# Patient Record
Sex: MALE | Race: WHITE | NOT HISPANIC OR LATINO | Employment: FULL TIME | URBAN - METROPOLITAN AREA
[De-identification: names, ages, dates, MRNs, and addresses within clinical notes are randomized per-mention and may not be internally consistent; named-entity substitution may affect disease eponyms.]

---

## 2023-01-04 ENCOUNTER — TRANSFERRED RECORDS (OUTPATIENT)
Dept: HEALTH INFORMATION MANAGEMENT | Facility: CLINIC | Age: 38
End: 2023-01-04

## 2023-01-10 ENCOUNTER — TRANSFERRED RECORDS (OUTPATIENT)
Dept: HEALTH INFORMATION MANAGEMENT | Facility: CLINIC | Age: 38
End: 2023-01-10

## 2023-02-17 ENCOUNTER — TRANSCRIBE ORDERS (OUTPATIENT)
Dept: OTHER | Age: 38
End: 2023-02-17

## 2023-02-17 ENCOUNTER — APPOINTMENT (OUTPATIENT)
Dept: URBAN - METROPOLITAN AREA CLINIC 259 | Age: 38
Setting detail: DERMATOLOGY
End: 2023-02-20

## 2023-02-17 DIAGNOSIS — L57.8 OTHER SKIN CHANGES DUE TO CHRONIC EXPOSURE TO NONIONIZING RADIATION: ICD-10-CM

## 2023-02-17 DIAGNOSIS — L81.4 OTHER MELANIN HYPERPIGMENTATION: ICD-10-CM

## 2023-02-17 DIAGNOSIS — D22 MELANOCYTIC NEVI: ICD-10-CM

## 2023-02-17 DIAGNOSIS — L29.8 OTHER PRURITUS: ICD-10-CM

## 2023-02-17 DIAGNOSIS — L40.0 PSORIASIS VULGARIS: ICD-10-CM

## 2023-02-17 DIAGNOSIS — C96.6 LANGERHAN'S CELL HISTIOCYTOSIS (H): Primary | ICD-10-CM

## 2023-02-17 PROBLEM — D22.62 MELANOCYTIC NEVI OF LEFT UPPER LIMB, INCLUDING SHOULDER: Status: ACTIVE | Noted: 2023-02-17

## 2023-02-17 PROBLEM — D22.61 MELANOCYTIC NEVI OF RIGHT UPPER LIMB, INCLUDING SHOULDER: Status: ACTIVE | Noted: 2023-02-17

## 2023-02-17 PROBLEM — D22.4 MELANOCYTIC NEVI OF SCALP AND NECK: Status: ACTIVE | Noted: 2023-02-17

## 2023-02-17 PROBLEM — D22.39 MELANOCYTIC NEVI OF OTHER PARTS OF FACE: Status: ACTIVE | Noted: 2023-02-17

## 2023-02-17 PROBLEM — D22.5 MELANOCYTIC NEVI OF TRUNK: Status: ACTIVE | Noted: 2023-02-17

## 2023-02-17 PROCEDURE — OTHER COUNSELING: OTHER

## 2023-02-17 PROCEDURE — OTHER PRESCRIPTION MEDICATION MANAGEMENT: OTHER

## 2023-02-17 PROCEDURE — 99203 OFFICE O/P NEW LOW 30 MIN: CPT

## 2023-02-17 PROCEDURE — OTHER MIPS QUALITY: OTHER

## 2023-02-17 PROCEDURE — OTHER PRESCRIPTION: OTHER

## 2023-02-17 RX ORDER — HYDROCORTISONE 25 MG/G
OINTMENT TOPICAL
Qty: 28.35 | Refills: 3 | Status: ERX | COMMUNITY
Start: 2023-02-17

## 2023-02-17 RX ORDER — FLUOCINONIDE 0.5 MG/G
OINTMENT TOPICAL
Qty: 60 | Refills: 3 | Status: ERX | COMMUNITY
Start: 2023-02-17

## 2023-02-17 ASSESSMENT — LOCATION SIMPLE DESCRIPTION DERM
LOCATION SIMPLE: RIGHT SUPERIOR EYELID
LOCATION SIMPLE: LEFT UPPER ARM
LOCATION SIMPLE: LEFT UPPER BACK
LOCATION SIMPLE: LEFT EAR
LOCATION SIMPLE: RIGHT EAR
LOCATION SIMPLE: RIGHT FOREARM
LOCATION SIMPLE: LEFT ANTERIOR NECK
LOCATION SIMPLE: RIGHT HAND
LOCATION SIMPLE: LEFT FOREARM
LOCATION SIMPLE: RIGHT ELBOW
LOCATION SIMPLE: CHEST
LOCATION SIMPLE: LEFT CHEEK
LOCATION SIMPLE: ABDOMEN

## 2023-02-17 ASSESSMENT — LOCATION ZONE DERM
LOCATION ZONE: EYELID
LOCATION ZONE: FACE
LOCATION ZONE: ARM
LOCATION ZONE: EAR
LOCATION ZONE: NECK
LOCATION ZONE: HAND
LOCATION ZONE: TRUNK

## 2023-02-17 ASSESSMENT — LOCATION DETAILED DESCRIPTION DERM
LOCATION DETAILED: LEFT SUPERIOR ANTERIOR NECK
LOCATION DETAILED: LEFT SUPERIOR MEDIAL UPPER BACK
LOCATION DETAILED: RIGHT DISTAL DORSAL FOREARM
LOCATION DETAILED: RIGHT ANTECUBITAL SKIN
LOCATION DETAILED: LEFT ANTERIOR DISTAL UPPER ARM
LOCATION DETAILED: RIGHT LATERAL SUPERIOR EYELID
LOCATION DETAILED: LEFT INFERIOR CENTRAL MALAR CHEEK
LOCATION DETAILED: PERIUMBILICAL SKIN
LOCATION DETAILED: LEFT INFERIOR MEDIAL UPPER BACK
LOCATION DETAILED: LEFT INFERIOR LATERAL NECK
LOCATION DETAILED: LEFT CRUS OF HELIX
LOCATION DETAILED: STERNUM
LOCATION DETAILED: RIGHT PROXIMAL DORSAL FOREARM
LOCATION DETAILED: RIGHT CRUS OF HELIX
LOCATION DETAILED: LEFT INFERIOR LATERAL MALAR CHEEK
LOCATION DETAILED: LEFT MEDIAL SUPERIOR CHEST
LOCATION DETAILED: RIGHT RADIAL DORSAL HAND
LOCATION DETAILED: LEFT PROXIMAL DORSAL FOREARM
LOCATION DETAILED: RIGHT CAVUM CONCHA
LOCATION DETAILED: LEFT CAVUM CONCHA
LOCATION DETAILED: LEFT LATERAL ABDOMEN
LOCATION DETAILED: RIGHT ULNAR DORSAL HAND

## 2023-02-17 NOTE — HPI: RASH
What Type Of Note Output Would You Prefer (Optional)?: Standard Output
How Severe Is Your Rash?: mild
Is This A New Presentation, Or A Follow-Up?: Rash
Additional History: Pt states his wife has psoriasis and he has been using her steroid cream which does help. He has joint pain in his right shoulder, hip and neck. He is allergic to mold, and has seasonal and pet allergies. His grandma and sister gave history of eczema. Pt denies bilateral joint pain that is chronic. Pt had a colonoscopy in January and had a polyp removed that had a Langerhan cell. His doctor is concerned about histiocytosis. He was advised to see a dermatologist if he was having any skin issues.

## 2023-02-17 NOTE — PROCEDURE: PRESCRIPTION MEDICATION MANAGEMENT
Detail Level: Zone
Samples Given: Zoryve
Initiate Treatment: Fluocinonide ointment BID\\nHydrocortisone 2.5% ointment to eyelids TIW PRN\\nZoryve QD PRN
Render In Strict Bullet Format?: No

## 2023-03-11 DIAGNOSIS — C96.6 LANGERHAN'S CELL HISTIOCYTOSIS (H): Primary | ICD-10-CM

## 2023-03-14 ENCOUNTER — ONCOLOGY VISIT (OUTPATIENT)
Dept: TRANSPLANT | Facility: CLINIC | Age: 38
End: 2023-03-14
Attending: INTERNAL MEDICINE
Payer: COMMERCIAL

## 2023-03-14 ENCOUNTER — APPOINTMENT (OUTPATIENT)
Dept: URBAN - METROPOLITAN AREA CLINIC 259 | Age: 38
Setting detail: DERMATOLOGY
End: 2023-03-14

## 2023-03-14 ENCOUNTER — TELEPHONE (OUTPATIENT)
Dept: TRANSPLANT | Facility: CLINIC | Age: 38
End: 2023-03-14

## 2023-03-14 ENCOUNTER — PATIENT OUTREACH (OUTPATIENT)
Dept: ONCOLOGY | Facility: CLINIC | Age: 38
End: 2023-03-14

## 2023-03-14 ENCOUNTER — APPOINTMENT (OUTPATIENT)
Dept: LAB | Facility: CLINIC | Age: 38
End: 2023-03-14
Attending: INTERNAL MEDICINE
Payer: COMMERCIAL

## 2023-03-14 VITALS
RESPIRATION RATE: 16 BRPM | HEIGHT: 72 IN | SYSTOLIC BLOOD PRESSURE: 116 MMHG | TEMPERATURE: 97.6 F | HEART RATE: 61 BPM | DIASTOLIC BLOOD PRESSURE: 76 MMHG | OXYGEN SATURATION: 98 % | WEIGHT: 215.9 LBS | BODY MASS INDEX: 29.24 KG/M2

## 2023-03-14 DIAGNOSIS — R21 RASH AND OTHER NONSPECIFIC SKIN ERUPTION: ICD-10-CM

## 2023-03-14 DIAGNOSIS — C96.6 LANGERHAN'S CELL HISTIOCYTOSIS (H): ICD-10-CM

## 2023-03-14 LAB
ALBUMIN SERPL BCG-MCNC: 4.5 G/DL (ref 3.5–5.2)
ALBUMIN UR-MCNC: NEGATIVE MG/DL
ALP SERPL-CCNC: 73 U/L (ref 40–129)
ALT SERPL W P-5'-P-CCNC: 26 U/L (ref 10–50)
ANION GAP SERPL CALCULATED.3IONS-SCNC: 11 MMOL/L (ref 7–15)
APPEARANCE UR: CLEAR
AST SERPL W P-5'-P-CCNC: 20 U/L (ref 10–50)
BASOPHILS # BLD AUTO: 0.1 10E3/UL (ref 0–0.2)
BASOPHILS NFR BLD AUTO: 1 %
BILIRUB SERPL-MCNC: 0.4 MG/DL
BILIRUB UR QL STRIP: NEGATIVE
BUN SERPL-MCNC: 19.4 MG/DL (ref 6–20)
CALCIUM SERPL-MCNC: 9.8 MG/DL (ref 8.6–10)
CHLORIDE SERPL-SCNC: 101 MMOL/L (ref 98–107)
COLOR UR AUTO: NORMAL
CORTIS SERPL-MCNC: 12 UG/DL
CREAT SERPL-MCNC: 1.01 MG/DL (ref 0.67–1.17)
CRP SERPL-MCNC: <3 MG/L
DEPRECATED HCO3 PLAS-SCNC: 24 MMOL/L (ref 22–29)
EOSINOPHIL # BLD AUTO: 0.3 10E3/UL (ref 0–0.7)
EOSINOPHIL NFR BLD AUTO: 5 %
ERYTHROCYTE [DISTWIDTH] IN BLOOD BY AUTOMATED COUNT: 12.2 % (ref 10–15)
ERYTHROCYTE [SEDIMENTATION RATE] IN BLOOD BY WESTERGREN METHOD: 8 MM/HR (ref 0–15)
FERRITIN SERPL-MCNC: 195 NG/ML (ref 31–409)
GFR SERPL CREATININE-BSD FRML MDRD: >90 ML/MIN/1.73M2
GH SERPL-MCNC: 0.1 UG/L
GLUCOSE SERPL-MCNC: 95 MG/DL (ref 70–99)
GLUCOSE UR STRIP-MCNC: NEGATIVE MG/DL
HCT VFR BLD AUTO: 43.3 % (ref 40–53)
HGB BLD-MCNC: 15.7 G/DL (ref 13.3–17.7)
HGB UR QL STRIP: NEGATIVE
IMM GRANULOCYTES # BLD: 0 10E3/UL
IMM GRANULOCYTES NFR BLD: 0 %
IRON BINDING CAPACITY (ROCHE): 289 UG/DL (ref 240–430)
IRON SATN MFR SERPL: 33 % (ref 15–46)
IRON SERPL-MCNC: 94 UG/DL (ref 61–157)
KETONES UR STRIP-MCNC: NEGATIVE MG/DL
LDH SERPL L TO P-CCNC: 156 U/L (ref 0–250)
LEUKOCYTE ESTERASE UR QL STRIP: NEGATIVE
LYMPHOCYTES # BLD AUTO: 3 10E3/UL (ref 0.8–5.3)
LYMPHOCYTES NFR BLD AUTO: 39 %
MCH RBC QN AUTO: 29.7 PG (ref 26.5–33)
MCHC RBC AUTO-ENTMCNC: 36.3 G/DL (ref 31.5–36.5)
MCV RBC AUTO: 82 FL (ref 78–100)
MONOCYTES # BLD AUTO: 0.8 10E3/UL (ref 0–1.3)
MONOCYTES NFR BLD AUTO: 10 %
NEUTROPHILS # BLD AUTO: 3.4 10E3/UL (ref 1.6–8.3)
NEUTROPHILS NFR BLD AUTO: 45 %
NITRATE UR QL: NEGATIVE
NRBC # BLD AUTO: 0 10E3/UL
NRBC BLD AUTO-RTO: 0 /100
PH UR STRIP: 5 [PH] (ref 5–7)
PLATELET # BLD AUTO: 259 10E3/UL (ref 150–450)
POTASSIUM SERPL-SCNC: 4.2 MMOL/L (ref 3.4–5.3)
PROT SERPL-MCNC: 6.8 G/DL (ref 6.4–8.3)
RBC # BLD AUTO: 5.29 10E6/UL (ref 4.4–5.9)
RBC URINE: 0 /HPF
SODIUM SERPL-SCNC: 136 MMOL/L (ref 136–145)
SP GR UR STRIP: 1.02 (ref 1–1.03)
TOTAL PROTEIN SERUM FOR ELP: 6.6 G/DL (ref 6.4–8.3)
TSH SERPL DL<=0.005 MIU/L-ACNC: 3.56 UIU/ML (ref 0.3–4.2)
UROBILINOGEN UR STRIP-MCNC: NORMAL MG/DL
WBC # BLD AUTO: 7.5 10E3/UL (ref 4–11)
WBC URINE: <1 /HPF

## 2023-03-14 PROCEDURE — OTHER COUNSELING: OTHER

## 2023-03-14 PROCEDURE — 81001 URINALYSIS AUTO W/SCOPE: CPT | Performed by: INTERNAL MEDICINE

## 2023-03-14 PROCEDURE — 86140 C-REACTIVE PROTEIN: CPT | Performed by: INTERNAL MEDICINE

## 2023-03-14 PROCEDURE — OTHER MIPS QUALITY: OTHER

## 2023-03-14 PROCEDURE — 99205 OFFICE O/P NEW HI 60 MIN: CPT | Performed by: INTERNAL MEDICINE

## 2023-03-14 PROCEDURE — 83615 LACTATE (LD) (LDH) ENZYME: CPT | Performed by: INTERNAL MEDICINE

## 2023-03-14 PROCEDURE — 84155 ASSAY OF PROTEIN SERUM: CPT | Performed by: INTERNAL MEDICINE

## 2023-03-14 PROCEDURE — 84165 PROTEIN E-PHORESIS SERUM: CPT | Mod: TC | Performed by: PATHOLOGY

## 2023-03-14 PROCEDURE — 80053 COMPREHEN METABOLIC PANEL: CPT | Performed by: INTERNAL MEDICINE

## 2023-03-14 PROCEDURE — OTHER BIOPSY BY PUNCH METHOD: OTHER

## 2023-03-14 PROCEDURE — 36415 COLL VENOUS BLD VENIPUNCTURE: CPT | Performed by: INTERNAL MEDICINE

## 2023-03-14 PROCEDURE — OTHER BIOPSY BY SHAVE METHOD: OTHER

## 2023-03-14 PROCEDURE — 85652 RBC SED RATE AUTOMATED: CPT | Performed by: INTERNAL MEDICINE

## 2023-03-14 PROCEDURE — 84443 ASSAY THYROID STIM HORMONE: CPT | Performed by: INTERNAL MEDICINE

## 2023-03-14 PROCEDURE — 83003 ASSAY GROWTH HORMONE (HGH): CPT | Performed by: INTERNAL MEDICINE

## 2023-03-14 PROCEDURE — 85025 COMPLETE CBC W/AUTO DIFF WBC: CPT | Performed by: INTERNAL MEDICINE

## 2023-03-14 PROCEDURE — 84165 PROTEIN E-PHORESIS SERUM: CPT | Mod: 26

## 2023-03-14 PROCEDURE — 11103 TANGNTL BX SKIN EA SEP/ADDL: CPT

## 2023-03-14 PROCEDURE — 11104 PUNCH BX SKIN SINGLE LESION: CPT

## 2023-03-14 PROCEDURE — 82728 ASSAY OF FERRITIN: CPT | Performed by: INTERNAL MEDICINE

## 2023-03-14 PROCEDURE — G0463 HOSPITAL OUTPT CLINIC VISIT: HCPCS | Performed by: INTERNAL MEDICINE

## 2023-03-14 PROCEDURE — 82533 TOTAL CORTISOL: CPT | Performed by: INTERNAL MEDICINE

## 2023-03-14 PROCEDURE — 83550 IRON BINDING TEST: CPT | Performed by: INTERNAL MEDICINE

## 2023-03-14 RX ORDER — FLUOCINONIDE 0.5 MG/G
OINTMENT TOPICAL PRN
COMMUNITY
Start: 2023-02-17

## 2023-03-14 RX ORDER — HYDROCORTISONE 25 MG/G
OINTMENT TOPICAL PRN
COMMUNITY
Start: 2023-02-17

## 2023-03-14 RX ORDER — HYDROXYZINE HYDROCHLORIDE 25 MG/1
1 TABLET, FILM COATED ORAL DAILY
COMMUNITY
Start: 2022-01-13

## 2023-03-14 RX ORDER — DESMOPRESSIN ACETATE 0.1 MG/1
TABLET ORAL 2 TIMES DAILY
COMMUNITY
Start: 2020-05-13

## 2023-03-14 ASSESSMENT — LOCATION DETAILED DESCRIPTION DERM
LOCATION DETAILED: RIGHT DORSAL MIDDLE METACARPOPHALANGEAL JOINT
LOCATION DETAILED: RIGHT PROXIMAL DORSAL MIDDLE FINGER

## 2023-03-14 ASSESSMENT — LOCATION SIMPLE DESCRIPTION DERM
LOCATION SIMPLE: RIGHT MIDDLE FINGER
LOCATION SIMPLE: RIGHT HAND

## 2023-03-14 ASSESSMENT — PAIN SCALES - GENERAL: PAINLEVEL: NO PAIN (0)

## 2023-03-14 ASSESSMENT — LOCATION ZONE DERM
LOCATION ZONE: FINGER
LOCATION ZONE: HAND

## 2023-03-14 NOTE — PROGRESS NOTES
Hematology/Oncology Consult Note    Marcio Carrillo MRN# [id number]   Age: 37 year old YOB: 1985          Reason for Consult:   Langerhans Cell Histiocytosis         Assessment and Plan:   Marcio Carrillo is a 37 year old   SSESSMENT:  1.  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  2.  Status post appendectomy.  3.  Diabetes insipidus.  4.  Skin rash.    PLAN:  I spoke with Clarisa and Munir about Langerhans cell histiocytosis, explaining its cell of origin and how LCH is more commonly seen in children, especially with bony disease.  Diabetes insipidus is  common in children with LCH. Involvement of the CNS requires systemic therapy because it is associated with adverse prognosis and increased risk of neurologic damage.  LCH in  the liver, spleen, and bone marrow confers a higher risk of treatment failure and death    I explained to him that patients with multisystem disease usually require chemotherapy, while unicentric disease does not. Since he has hypophysis and GI involvement he technically has low risk multisystem LCH disease.In patients with low risk disease in general, chemotherapy would be most useful. Low dose Daniella C or cladribine have been shown to be effective.In studies published in PLOS One ( PLoS One. 2012;7(8):l65425. Epub 2012 Aug 15)  and in Blood in 2015 (Sep 17;126(12):1415-23. doi: 10.1182/[id number]), cytosine arabinoside 5 days every month for 12 months has been effective In his case, it would be ideal if we could repeat the MRI of the pituitary and brain, as well as obtain the pathologic specimen of the colon to be able to do BRAF staining.  I would also like him to get pulmonary functions and talked to him about quitting smoking. I talked to him about the side effects of chemotherapy, the issues of fertility and sterility and need for potential sperm banking.  He was  interested in sperm banking, nor was his wife, Clarisa.    The main question is  does he need treatment now?.  I do not feel that one could consider using a BRAF inhibitor, MEK or MAP kinase inhibitor alone at this time.  I explained to them that the cytosine arabinoside would not probably improve his diabetes insipidus and that he needs to continue taking the desmopressin.  I suggested that he get another opinion at the [facility] with Dr. Nettles.  I spoke with Dr. Carey, who will arrange an MRI of the pituitary, pulmonary functions and Chang referral.  I also would ask Dr. Carlos to review his MRI of the pituitary and see if the lesion has expanded or if there are any other neurodegenerative changes on his MRI consistent with LCH. He should see Dermatology and see if the skin lesions are LCH. If biopsy is done molecular studies for BRAF, MEK and MAPK mutations should be looked for in the skin We gave him some patient information about LCH.  We urged him to quit smoking, although he does not seem to have any changes on his CT in his lungs.  I will set up an appointment in a couple of months.  We talked about whether or not watchful waiting would be reasonable with frequent colonoscopies, and I would like to have a consensus of opinion on his treatment with all of the physicians involved and will await the opinion at [facility].    I spent a total of 60 minutes face to face with Marcio Carrillo during today's office visit. Over 50% of this time was spent counseling the patient and coordinating the care regarding Langerhans cell histiocytosis  and 30 minutes preparing to see the patient and  care coordination     Nikita Avery MD  [phone]  ADDENDUM  BRAF STAIN POSITIVE ON COLON BIOPSY CONFIRMING LCH         History of Present Illness:   History obtained from chart review and confirmed with patient.  HISTORY OF PRESENT ILLNESS:  I had the pleasure of seeing Munir Carrillo for evaluation of diabetes insipidus and evidence of a colonic nodule consistent with Langerhans cell histiocytosis.  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  He is here today along with his wife, Clarisa, for evaluation of Langerhans cell histiocytosis. Munir had noted back in 2017 and 2018, especially when he was in [place] that he was drinking a lot of water and urinating frequently.  He states that he would get up several times a night and, in fact, would drink three 20-ounce cups of water in the morning and then several times drink during the night and five to six 20 ounce bottles later in the day.  He was found to have a low urine specific gravity and urine osmolality.  His specific gravity was 1.010. His urine osmolality was 190, his blood osmolality was 299 and his serum sodium 143.  He underwent an MRI and was found to have a mass in his pituitary.  He had no visual symptoms and a diagnosis of diabetes insipidus was made.  There were no other lesions, no other lung lesions.  He was started on intranasal desmopressin and then subsequently on desmopressin 0.1 mg per day and has had no increased thirst symptoms recently..  He is followed by Dr. Carlos, an endocrinologist.  He notes that he has had a skin rash for years, mainly in his ears, his eyelids and knuckle.  He had been seen by Dermatology.  He was given Lidex and hydrocortisone that caused improvement.  No biopsy was done.  In January, he had a colonoscopy because his mother was diagnosed with colon cancer in 10/2022.  A polyp was found and a nodule.  The nodule proved to be consistent with Langerhans cell histiocytosis.  The one polyp was a tubular adenoma.  The other polyp was a Langerhans cell nodule, well circumscribed, 1 mm, predominantly submucosal.  The nodule was positive for S100.  CD68 was weakly positive and CD1A was positive.  BRAF staining was not done.    He was then seen by Oncology, Dr. Didier Carey, who suggested that he come to the [facility] for further evaluation.  He has no visual symptoms and feels good.  He  otherwise denies any fever, chills, nausea or vomiting.  He denies any respiratory symptoms, although he continues to smoke.  A PET scan was done, which showed increased uptake in C4-C7, as well as the right ilium, but the CT showed no lesions of lytic nature or blastic lesions.         Review of Systems:   A comprehensive ROS was performed with the patient and was found to be negative with the exception of that noted in the HPI above  EYES No visual sx  EARS No hearing issues occ distraction hearing issues  SINUS No sinus trouble but seasonal allergies  CHEST No cough no SOB no chest pain no asthma  COR No chest pain no palpitations  GI Occ reflux No N,V or Diarrhea.No liver issures  MS some joint pain in hands No other bone pain  SKIN Psoriasis as in HPI  ENDO Diabetes Insipidu  No thyroid issues no diabetes mellitus  Blood glucose ok  NEURO DI as in hpi no headache or seizures or weaknes  PSYCH mood OK  ID Had COVID in   HEME No anemia or bleeding or clotting issue       Past Medical History:     Past Medical History:   Diagnosis Date     NO ACTIVE PROBLEMS    Seasonal allergie  Diabetes Insipidus         Past Surgical History:     Past Surgical History:   Procedure Laterality Date     [facility] NONSPECIFIC PROCEDURE      lipoma removal right buttock     Appendectomy ruptured appendix 2022       Social History:   Still smoking a few cigarettes  Social History     Socioeconomic History     Marital status:      Spouse name: Not on file     Number of children: Not on file     Years of education: Not on file     Highest education level: Not on file   Occupational History     Not on file   Tobacco Use     Smoking status: Former     Packs/day: 0.50     Years: 8.00     Pack years: 4.00     Types: Cigarettes     Quit date: 2008     Years since quittin.4     Smokeless tobacco: Never     Tobacco comments:     month   Substance and Sexual Activity     Alcohol use: Yes     Comment: Minimal use     Drug  use: No     Sexual activity: Yes     Partners: Female     Birth control/protection: Pill   Other Topics Concern      Service No     Blood Transfusions No     Caffeine Concern No     Occupational Exposure No     Hobby Hazards No     Sleep Concern No     Stress Concern No     Weight Concern Yes     Special Diet Yes     Back Care No     Exercise Yes     Bike Helmet Yes     Seat Belt Yes     Self-Exams No     Parent/sibling w/ CABG, MI or angioplasty before 65F 55M? Not Asked   Social History Narrative     Not on file     Social Determinants of Health     Financial Resource Strain: Not on file   Food Insecurity: Not on file   Transportation Needs: Not on file   Physical Activity: Not on file   Stress: Not on file   Social Connections: Not on file   Intimate Partner Violence: Not on file   Housing Stability: Not on file            Family History:     Family History   Problem Relation Age of Onset     Hypertension Father         COPD     Heart Disease Father         heart attack     Diabetes Maternal Grandfather      Hypertension Paternal Grandfather             Allergies:     Allergies   Allergen Reactions     Cats Other (See Comments)     Dogs      Horse Allergy             Medications:   (Not in a hospital admission)           Physical Exam:   /76   Pulse 61   Temp 97.6  F (36.4  C) (Oral)   Resp 16   Wt 97.9 kg (215 lb 14.4 oz)   SpO2 98%   BMI 28.88 kg/m    Vitals:    03/14/23 0732   Weight: 97.9 kg (215 lb 14.4 oz)     General: Appears well, sitting in bed, in no acute distress.  Heme/Lymph: No overt bleeding. No cervical, axillary, or supraclavicular adenopathy.  Skin: Scaly rash over 3rd right metatarsal join, pink to purple scaly rash insider left ear and right ears worrse on left.  HEENT: NCAT. EOMI, anicteric sclera. Oral mucosa pink and moist with no lesions or thrush.  Respiratory: Non-labored breathing, good air exchange, lungs clear to auscultation bilaterally.No wheezes  Cardiovascular:  Regular rate and rhythm. No murmur or rub.   Gastrointestinal: Normoactive bowel sounds. Abdomen soft, non-distended, and non-tender. No palpable masses or organomegaly.  Extremities: No extremity edema.   Neurologic: A&O x 3, speech normal, sensation to light touch grossly WNL.         Data:   I have personally reviewed the following labs/imaging:  CBC@LABRCNTIPR(wbc:4,rbc:4,hgb:4,hct:4,mcv:4,mch:4,mchc:4,rdw:4,plt:4)@  CMP@LABRCNTIPR(na:4,potassium:4,chloride:4,co2:4,aniongap:4,g,bun:4,cr:4,gfrestimated:4,gfrestblack:4,austin:4,ma,phos:4,prottotal:4,albumin:4,bilitotal:4,alkphos:4,ast:4,alt:4)@  INR@LABRCNTIPR(inr:4)@   Latest Reference Range & Units 23 07:39   Sodium 136 - 145 mmol/L 136   Potassium 3.4 - 5.3 mmol/L 4.2   Chloride 98 - 107 mmol/L 101   Carbon Dioxide (CO2) 22 - 29 mmol/L 24   Urea Nitrogen 6.0 - 20.0 mg/dL 19.4   Creatinine 0.67 - 1.17 mg/dL 1.01   GFR Estimate >60 mL/min/1.73m2 >90   Calcium 8.6 - 10.0 mg/dL 9.8   Anion Gap 7 - 15 mmol/L 11   Albumin 3.5 - 5.2 g/dL 4.5   Protein Total 6.4 - 8.3 g/dL 6.8   Alkaline Phosphatase 40 - 129 U/L 73   ALT 10 - 50 U/L 26   AST 10 - 50 U/L 20   Bilirubin Total <=1.2 mg/dL 0.4   Cortisol Serum ug/dL 12.0   CRP Inflammation <5.00 mg/L <3.00   Ferritin 31 - 409 ng/mL 195   Glucose 70 - 99 mg/dL 95   Growth Hormone <1.3 ug/L 0.1   Iron 61 - 157 ug/dL 94   Iron Binding Capacity 240 - 430 ug/dL 289   Iron Sat Index 15 - 46 % 33   Lactate Dehydrogenase 0 - 250 U/L 156   TSH 0.30 - 4.20 uIU/mL 3.56   WBC 4.0 - 11.0 10e3/uL 7.5   Hemoglobin 13.3 - 17.7 g/dL 15.7   Hematocrit 40.0 - 53.0 % 43.3   Platelet Count 150 - 450 10e3/uL 259   RBC Count 4.40 - 5.90 10e6/uL 5.29   MCV 78 - 100 fL 82   MCH 26.5 - 33.0 pg 29.7   MCHC 31.5 - 36.5 g/dL 36.3   RDW 10.0 - 15.0 % 12.2   % Neutrophils % 45   % Lymphocytes % 39   % Monocytes % 10   % Eosinophils % 5   % Basophils % 1   Absolute Basophils 0.0 - 0.2 10e3/uL 0.1   Absolute Eosinophils 0.0 - 0.7 10e3/uL 0.3    Absolute Immature Granulocytes <=0.4 10e3/uL 0.0   Absolute Lymphocytes 0.8 - 5.3 10e3/uL 3.0   Absolute Monocytes 0.0 - 1.3 10e3/uL 0.8   % Immature Granulocytes % 0   Absolute Neutrophils 1.6 - 8.3 10e3/uL 3.4   Absolute NRBCs 10e3/uL 0.0   NRBCs per 100 WBC <1 /100 0   Sed Rate 0 - 15 mm/hr 8   Color Urine Colorless, Straw, Light Yellow, Yellow  Light Yellow   Appearance Urine Clear  Clear   Glucose Urine Negative mg/dL Negative   Bilirubin Urine Negative  Negative   Ketones Urine Negative mg/dL Negative   Specific Gravity Urine 1.003 - 1.035  1.016   pH Urine 5.0 - 7.0  5.0   Protein Albumin Urine Negative mg/dL Negative   Urobilinogen mg/dL Normal, 2.0 mg/dL Normal   Nitrite Urine Negative  Negative   Blood Urine Negative  Negative   Leukocyte Esterase Urine Negative  Negative   WBC Urine <=5 /HPF <1   RBC Urine <=2 /HPF 0   Total Protein Serum for ELP 6.4 - 8.3 g/dL 6.6

## 2023-03-14 NOTE — TELEPHONE ENCOUNTER
Faxed request to [facility] for pathology slides from case [id number] per in-basket request of MAURI Carter.  [employer] tracking number: [id number]

## 2023-03-14 NOTE — PROCEDURE: BIOPSY BY PUNCH METHOD
Information: Selecting Yes will display possible errors in your note based on the variables you have selected. This validation is only offered as a suggestion for you. PLEASE NOTE THAT THE VALIDATION TEXT WILL BE REMOVED WHEN YOU FINALIZE YOUR NOTE. IF YOU WANT TO FAX A PRELIMINARY NOTE YOU WILL NEED TO TOGGLE THIS TO 'NO' IF YOU DO NOT WANT IT IN YOUR FAXED NOTE.
Validate Note Data (See Information Below): Yes
Post-Care Instructions: I reviewed with the patient in detail post-care instructions. Patient is to keep the biopsy site dry overnight, and then apply bacitracin twice daily until healed. Patient may apply hydrogen peroxide soaks to remove any crusting.
Epidermal Sutures: gel foam
Depth Of Punch Biopsy: dermis
Hemostasis: Drysol
Additional Anesthesia Volume In Cc (Will Not Render If 0): 0
Bill [id number] For Specimen Handling/Conveyance To Laboratory?: no
Billing Type: Third-Party Bill
Punch Size In Mm: 3
Wound Care: Petrolatum
Notification Instructions: Patient will be notified of biopsy results. However, patient instructed to call the office if not contacted within 2 weeks.
Biopsy Type: H and E
Anesthesia Volume In Cc (Will Not Render If 0): 0.5
Detail Level: Detailed
Home Suture Removal Text: Patient was provided a home suture removal kit and will remove their sutures at home.  If they have any questions or difficulties they will call the office.
Anesthesia Type: 1% lidocaine with epinephrine
Consent: Written consent was obtained and risks were reviewed including but not limited to scarring, infection, bleeding, scabbing, incomplete removal, nerve damage and allergy to anesthesia.
Dressing: bandage

## 2023-03-14 NOTE — LETTER
3/14/2023         RE: Marcio Carrillo  [address] [facility] [address] [facility] [address] [address]        Dear Colleague,    Thank you for referring your patient, Marcio Carrillo, to the [facility]LOOD AND MARROW [facility]ANSPLANT PRO[facility] [place]. Please see a copy of my visit note below.       Hematology/Oncology Consult Note    Marcio Carrillo MRN# [id number]   Age: 37 year old YOB: 1985          Reason for Consult:   Langerhans Cell Histiocytosis         Assessment and Plan:   Marcio Carrillo is a 37 year old   SSESSMENT:  1.  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  2.  Status post appendectomy.  3.  Diabetes insipidus.  4.  Skin rash.    PLAN:  I spoke with Mercedes about Langerhans cell histiocytosis, explaining its cell of origin and how LCH is more commonly seen in children, especially with bony disease.  Diabetes insipidus is  common in children with LCH. Involvement of the CNS requires systemic therapy because it is associated with adverse prognosis and increased risk of neurologic damage.  LCH in  the liver, spleen, and bone marrow confers a higher risk of treatment failure and death    I explained to him that patients with multisystem disease usually require chemotherapy, while unicentric disease does not. Since he has hypophysis and GI involvement he technically has low risk multisystem LCH disease.In patients with low risk disease in general, chemotherapy would be most useful. Low dose Daniella C or cladribine have been shown to be effective.In studies published in PLOS One ( PLoS One. 2012;7(8):y96797. Epub 2012 Aug 15)  and in Blood in 2015 (Sep 17;126(12):1415-23. doi: 10.1182/krsod-[id number]), cytosine arabinoside 5 days every month for 12 months has been effective In his case, it would be ideal if we could repeat the MRI of the pituitary and brain, as well as obtain the pathologic specimen of the colon to be able to do BRAF staining.  I would also like him  to get pulmonary functions and talked to him about quitting smoking. I talked to him about the side effects of chemotherapy, the issues of fertility and sterility and need for potential sperm banking.  He was  interested in sperm banking, nor was his wife, Clarisa.    The main question is does he need treatment now?.  I do not feel that one could consider using a BRAF inhibitor, MEK or MAP kinase inhibitor alone at this time.  I explained to them that the cytosine arabinoside would not probably improve his diabetes insipidus and that he needs to continue taking the desmopressin.  I suggested that he get another opinion at the [facility] with Dr. Nettles.  I spoke with Dr. Carey, who will arrange an MRI of the pituitary, pulmonary functions and Chang referral.  I also would ask Dr. Carlos to review his MRI of the pituitary and see if the lesion has expanded or if there are any other neurodegenerative changes on his MRI consistent with LCH. He should see Dermatology and see if the skin lesions are LCH. If biopsy is done molecular studies for BRAF, MEK and MAPK mutations should be looked for in the skin We gave him some patient information about LCH.  We urged him to quit smoking, although he does not seem to have any changes on his CT in his lungs.  I will set up an appointment in a couple of months.  We talked about whether or not watchful waiting would be reasonable with frequent colonoscopies, and I would like to have a consensus of opinion on his treatment with all of the physicians involved and will await the opinion at [facility].    I spent a total of 60 minutes face to face with Marcio Carrillo during today's office visit. Over 50% of this time was spent counseling the patient and coordinating the care regarding Langerhans cell histiocytosis  and 30 minutes preparing to see the patient and  care coordination     Nikita Avery MD  [phone]         History of Present Illness:   History obtained from chart  review and confirmed with patient.  HISTORY OF PRESENT ILLNESS:  I had the pleasure of seeing Munir Carrillo for evaluation of diabetes insipidus and evidence of a colonic nodule consistent with Langerhans cell histiocytosis.  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  He is here today along with his wife, Clarisa, for evaluation of Langerhans cell histiocytosis. Munir had noted back in 2017 and 2018, especially when he was in [place] that he was drinking a lot of water and urinating frequently.  He states that he would get up several times a night and, in fact, would drink three 20-ounce cups of water in the morning and then several times drink during the night and five to six 20 ounce bottles later in the day.  He was found to have a low urine specific gravity and urine osmolality.  His specific gravity was 1.010. His urine osmolality was 190, his blood osmolality was 299 and his serum sodium 143.  He underwent an MRI and was found to have a mass in his pituitary.  He had no visual symptoms and a diagnosis of diabetes insipidus was made.  There were no other lesions, no other lung lesions.  He was started on intranasal desmopressin and then subsequently on desmopressin 0.1 mg per day and has had no increased thirst symptoms recently..  He is followed by Dr. Carlos, an endocrinologist.  He notes that he has had a skin rash for years, mainly in his ears, his eyelids and knuckle.  He had been seen by Dermatology.  He was given Lidex and hydrocortisone that caused improvement.  No biopsy was done.  In January, he had a colonoscopy because his mother was diagnosed with colon cancer in 10/2022.  A polyp was found and a nodule.  The nodule proved to be consistent with Langerhans cell histiocytosis.  The one polyp was a tubular adenoma.  The other polyp was a Langerhans cell nodule, well circumscribed, 1 mm, predominantly submucosal.  The nodule was positive for S100.  CD68 was weakly  positive and CD1A was positive.  BRAF staining was not done.    He was then seen by Oncology, Dr. Didier Carey, who suggested that he come to the [facility] for further evaluation.  He has no visual symptoms and feels good.  He otherwise denies any fever, chills, nausea or vomiting.  He denies any respiratory symptoms, although he continues to smoke.  A PET scan was done, which showed increased uptake in C4-C7, as well as the right ilium, but the CT showed no lesions of lytic nature or blastic lesions.         Review of Systems:   A comprehensive ROS was performed with the patient and was found to be negative with the exception of that noted in the HPI above  EYES No visual sx  EARS No hearing issues occ distraction hearing issues  SINUS No sinus trouble but seasonal allergies  CHEST No cough no SOB no chest pain no asthma  COR No chest pain no palpitations  GI Occ reflux No N,V or Diarrhea.No liver issures  MS some joint pain in hands No other bone pain  SKIN Psoriasis as in HPI  ENDO Diabetes Insipidu  No thyroid issues no diabetes mellitus  Blood glucose ok  NEURO DI as in hpi no headache or seizures or weaknes  PSYCH mood OK  ID Had COVID in Feb 21  HEME No anemia or bleeding or clotting issue       Past Medical History:     Past Medical History:   Diagnosis Date     NO ACTIVE PROBLEMS    Seasonal allergie  Diabetes Insipidus         Past Surgical History:     Past Surgical History:   Procedure Laterality Date     Z NONSPECIFIC PROCEDURE      lipoma removal right buttock     Appendectomy ruptured appendix June 2022       Social History:   Still smoking a few cigarettes  Social History     Socioeconomic History     Marital status:      Spouse name: Not on file     Number of children: Not on file     Years of education: Not on file     Highest education level: Not on file   Occupational History     Not on file   Tobacco Use     Smoking status: Former     Packs/day: 0.50     Years: 8.00     Pack  years: 4.00     Types: Cigarettes     Quit date: 2008     Years since quittin.4     Smokeless tobacco: Never     Tobacco comments:     month   Substance and Sexual Activity     Alcohol use: Yes     Comment: Minimal use     Drug use: No     Sexual activity: Yes     Partners: Female     Birth control/protection: Pill   Other Topics Concern      Service No     Blood Transfusions No     Caffeine Concern No     Occupational Exposure No     Hobby Hazards No     Sleep Concern No     Stress Concern No     Weight Concern Yes     Special Diet Yes     Back Care No     Exercise Yes     Bike Helmet Yes     Seat Belt Yes     Self-Exams No     Parent/sibling w/ CABG, MI or angioplasty before 65F 55M? Not Asked   Social History Narrative     Not on file     Social Determinants of Health     Financial Resource Strain: Not on file   Food Insecurity: Not on file   Transportation Needs: Not on file   Physical Activity: Not on file   Stress: Not on file   Social Connections: Not on file   Intimate Partner Violence: Not on file   Housing Stability: Not on file            Family History:     Family History   Problem Relation Age of Onset     Hypertension Father         COPD     Heart Disease Father         heart attack     Diabetes Maternal Grandfather      Hypertension Paternal Grandfather             Allergies:     Allergies   Allergen Reactions     Cats Other (See Comments)     Dogs      Horse Allergy             Medications:   (Not in a hospital admission)           Physical Exam:   /76   Pulse 61   Temp 97.6  F (36.4  C) (Oral)   Resp 16   Wt 97.9 kg (215 lb 14.4 oz)   SpO2 98%   BMI 28.88 kg/m    Vitals:    23 0732   Weight: 97.9 kg (215 lb 14.4 oz)     General: Appears well, sitting in bed, in no acute distress.  Heme/Lymph: No overt bleeding. No cervical, axillary, or supraclavicular adenopathy.  Skin: Scaly rash over 3rd right metatarsal join, pink to purple scaly rash insider left ear and  right ears worrse on left.  HEENT: NCAT. EOMI, anicteric sclera. Oral mucosa pink and moist with no lesions or thrush.  Respiratory: Non-labored breathing, good air exchange, lungs clear to auscultation bilaterally.No wheezes  Cardiovascular: Regular rate and rhythm. No murmur or rub.   Gastrointestinal: Normoactive bowel sounds. Abdomen soft, non-distended, and non-tender. No palpable masses or organomegaly.  Extremities: No extremity edema.   Neurologic: A&O x 3, speech normal, sensation to light touch grossly WNL.         Data:   I have personally reviewed the following labs/imaging:  CBC@LABRCNTIPR(wbc:4,rbc:4,hgb:4,hct:4,mcv:4,mch:4,mchc:4,rdw:4,plt:4)@  CMP@LABRCNTIPR(na:4,potassium:4,chloride:4,co2:4,aniongap:4,g,bun:4,cr:4,gfrestimated:4,gfrestblack:4,austin:4,ma,phos:4,prottotal:4,albumin:4,bilitotal:4,alkphos:4,ast:4,alt:4)@  INR@LABRCNTIPR(inr:4)@   Latest Reference Range & Units 23 07:39   Sodium 136 - 145 mmol/L 136   Potassium 3.4 - 5.3 mmol/L 4.2   Chloride 98 - 107 mmol/L 101   Carbon Dioxide (CO2) 22 - 29 mmol/L 24   Urea Nitrogen 6.0 - 20.0 mg/dL 19.4   Creatinine 0.67 - 1.17 mg/dL 1.01   GFR Estimate >60 mL/min/1.73m2 >90   Calcium 8.6 - 10.0 mg/dL 9.8   Anion Gap 7 - 15 mmol/L 11   Albumin 3.5 - 5.2 g/dL 4.5   Protein Total 6.4 - 8.3 g/dL 6.8   Alkaline Phosphatase 40 - 129 U/L 73   ALT 10 - 50 U/L 26   AST 10 - 50 U/L 20   Bilirubin Total <=1.2 mg/dL 0.4   Cortisol Serum ug/dL 12.0   CRP Inflammation <5.00 mg/L <3.00   Ferritin 31 - 409 ng/mL 195   Glucose 70 - 99 mg/dL 95   Growth Hormone <1.3 ug/L 0.1   Iron 61 - 157 ug/dL 94   Iron Binding Capacity 240 - 430 ug/dL 289   Iron Sat Index 15 - 46 % 33   Lactate Dehydrogenase 0 - 250 U/L 156   TSH 0.30 - 4.20 uIU/mL 3.56   WBC 4.0 - 11.0 10e3/uL 7.5   Hemoglobin 13.3 - 17.7 g/dL 15.7   Hematocrit 40.0 - 53.0 % 43.3   Platelet Count 150 - 450 10e3/uL 259   RBC Count 4.40 - 5.90 10e6/uL 5.29   MCV 78 - 100 fL 82   MCH 26.5 - 33.0 pg 29.7    MCHC 31.5 - 36.5 g/dL 36.3   RDW 10.0 - 15.0 % 12.2   % Neutrophils % 45   % Lymphocytes % 39   % Monocytes % 10   % Eosinophils % 5   % Basophils % 1   Absolute Basophils 0.0 - 0.2 10e3/uL 0.1   Absolute Eosinophils 0.0 - 0.7 10e3/uL 0.3   Absolute Immature Granulocytes <=0.4 10e3/uL 0.0   Absolute Lymphocytes 0.8 - 5.3 10e3/uL 3.0   Absolute Monocytes 0.0 - 1.3 10e3/uL 0.8   % Immature Granulocytes % 0   Absolute Neutrophils 1.6 - 8.3 10e3/uL 3.4   Absolute NRBCs 10e3/uL 0.0   NRBCs per 100 WBC <1 /100 0   Sed Rate 0 - 15 mm/hr 8   Color Urine Colorless, Straw, Light Yellow, Yellow  Light Yellow   Appearance Urine Clear  Clear   Glucose Urine Negative mg/dL Negative   Bilirubin Urine Negative  Negative   Ketones Urine Negative mg/dL Negative   Specific Gravity Urine 1.003 - 1.035  1.016   pH Urine 5.0 - 7.0  5.0   Protein Albumin Urine Negative mg/dL Negative   Urobilinogen mg/dL Normal, 2.0 mg/dL Normal   Nitrite Urine Negative  Negative   Blood Urine Negative  Negative   Leukocyte Esterase Urine Negative  Negative   WBC Urine <=5 /HPF <1   RBC Urine <=2 /HPF 0   Total Protein Serum for ELP 6.4 - 8.3 g/dL 6.6           Nikita Avery MD

## 2023-03-14 NOTE — PROCEDURE: BIOPSY BY SHAVE METHOD
Hide Biopsy Depth?: No
Size Of Lesion In Cm: 0
Silver Nitrate Text: The wound bed was treated with silver nitrate after the biopsy was performed.
Depth Of Biopsy: dermis
Wound Care: Petrolatum
Biopsy Method: Dermablade
Billing Type: Third-Party Bill
Biopsy Type: H and E
Notification Instructions: Patient will be notified of biopsy results. However, patient instructed to call the office if not contacted within 2 weeks.
Cryotherapy Text: The wound bed was treated with cryotherapy after the biopsy was performed.
Anesthesia Type: 1% lidocaine with epinephrine
Electrodesiccation Text: The wound bed was treated with electrodesiccation after the biopsy was performed.
Was A Bandage Applied: Yes
Detail Level: Detailed
Hemostasis: Drysol
Post-Care Instructions: I reviewed with the patient in detail post-care instructions. Patient is to keep the biopsy site dry overnight, and then apply bacitracin twice daily until healed. Patient may apply hydrogen peroxide soaks to remove any crusting.
Anesthesia Volume In Cc (Will Not Render If 0): 0.5
Information: Selecting Yes will display possible errors in your note based on the variables you have selected. This validation is only offered as a suggestion for you. PLEASE NOTE THAT THE VALIDATION TEXT WILL BE REMOVED WHEN YOU FINALIZE YOUR NOTE. IF YOU WANT TO FAX A PRELIMINARY NOTE YOU WILL NEED TO TOGGLE THIS TO 'NO' IF YOU DO NOT WANT IT IN YOUR FAXED NOTE.
Curettage Text: The wound bed was treated with curettage after the biopsy was performed.
Consent: Written consent was obtained and risks were reviewed including but not limited to scarring, infection, bleeding, scabbing, incomplete removal, nerve damage and allergy to anesthesia.
Electrodesiccation And Curettage Text: The wound bed was treated with electrodesiccation and curettage after the biopsy was performed.
Type Of Destruction Used: Curettage
Dressing: bandage

## 2023-03-14 NOTE — PROGRESS NOTES
"[facility]: Cancer Care Initial Note                                    Discussion with Patient:                                                      RN met with pt, Munir, and his wife, Clarisa, today. Introduced self and role of RN Care Coordinator at [facility]. Provided my contact information, [facility] phone number (which has options to talk with a Nurse available 24/7 - triage and RNCC via this option during business hours).   Discussed use of MyChart including to not use it for symptoms and time expectations.      Had pt complete authorization to discuss PHI form.  Gave form to BOH team to enter into Epic.  Completed learning assessment with patient.  Pt denied any questions at end of conversation.      Assessment:                                                      Initial  Current living arrangement:: I live in a private home with spouse  Informal Support system:: Spouse;Family;Friends  Bed or wheelchair confined:: No  Mobility Status: Independent  Transportation means:: Regular car    Completed learning assessment with pt.    Intervention/Education provided during outreach:                                                       Pt completed authorization to discuss PHI form.  Pt gave us permission to talk with his wife, Clarisa, about all information.  Gave form to BOH team to enter into Epic.      Reviewed plan, per Dr. Salvador Avery, that Dr. Carey will order MRI, PET and referral to [facility].  Dr. Avery planning to requesting pathology from [facility] for review and straining by our pathology team.  Return to clinic to be determined.    RN sent high priority message to coworker, Mary Lou CHONG, to request slides and tissue from [facility] for our path dept to review and stain for BRAF.    Per conversation with ebnoi Ortega path staff member, need to wait until incoming slides are accessioned into [facility] system with a \"UO\" number.  Then completer pathology additional testing order in Epic " ([id number]?) and include additional testing for BRAF in order.     Signature:  Misa Guerrero RN, OCN

## 2023-03-14 NOTE — NURSING NOTE
Chief Complaint   Patient presents with     Blood Draw     VPT blood draw by lab RN. Vitals taken and appointment arrived     Katerine Higgins RN

## 2023-03-14 NOTE — NURSING NOTE
"Oncology Rooming Note    March 14, 2023 8:00 AM   Marcio Carrillo is a 37 year old male who presents for:    Chief Complaint   Patient presents with     Blood Draw     VPT blood draw by lab RN. Vitals taken and appointment arrived     New Patient     Langerhans cell histiocytosis      Initial Vitals: /76   Pulse 61   Temp 97.6  F (36.4  C) (Oral)   Resp 16   Ht 1.841 m (6' 0.48\")   Wt 97.9 kg (215 lb 14.4 oz)   SpO2 98%   BMI 28.89 kg/m   Estimated body mass index is 28.89 kg/m  as calculated from the following:    Height as of this encounter: 1.841 m (6' 0.48\").    Weight as of this encounter: 97.9 kg (215 lb 14.4 oz). Body surface area is 2.24 meters squared.  No Pain (0) Comment: Data Unavailable   No LMP for male patient.  Allergies reviewed: Yes  Medications reviewed: Yes    Medications: Medication refills not needed today.  Pharmacy name entered into [facility]:    [id number] [place] [facility] [address] [facility] [address], [address]  [id number] - [place], [place] - [address] AT [facility]    Clinical concerns: new patient       Marilin Malave CMA            "

## 2023-03-15 LAB
ALBUMIN SERPL ELPH-MCNC: 4.3 G/DL (ref 3.7–5.1)
ALPHA1 GLOB SERPL ELPH-MCNC: 0.2 G/DL (ref 0.2–0.4)
ALPHA2 GLOB SERPL ELPH-MCNC: 0.6 G/DL (ref 0.5–0.9)
B-GLOBULIN SERPL ELPH-MCNC: 0.7 G/DL (ref 0.6–1)
GAMMA GLOB SERPL ELPH-MCNC: 0.8 G/DL (ref 0.7–1.6)
M PROTEIN SERPL ELPH-MCNC: 0 G/DL
PROT PATTERN SERPL ELPH-IMP: NORMAL

## 2023-03-20 ENCOUNTER — APPOINTMENT (OUTPATIENT)
Dept: URBAN - METROPOLITAN AREA CLINIC 259 | Age: 38
Setting detail: DERMATOLOGY
End: 2023-03-20

## 2023-03-23 NOTE — TELEPHONE ENCOUNTER
Called [facility] to cancel request for slides ([place] per in-basket message of Dr. Nikita Avery

## 2023-04-22 ENCOUNTER — HEALTH MAINTENANCE LETTER (OUTPATIENT)
Age: 38
End: 2023-04-22

## 2023-09-07 ENCOUNTER — APPOINTMENT (OUTPATIENT)
Dept: URBAN - METROPOLITAN AREA CLINIC 259 | Age: 38
Setting detail: DERMATOLOGY
End: 2023-09-07

## 2023-09-07 DIAGNOSIS — D22 MELANOCYTIC NEVI: ICD-10-CM

## 2023-09-07 DIAGNOSIS — L20.89 OTHER ATOPIC DERMATITIS: ICD-10-CM

## 2023-09-07 PROBLEM — D23.71 OTHER BENIGN NEOPLASM OF SKIN OF RIGHT LOWER LIMB, INCLUDING HIP: Status: ACTIVE | Noted: 2023-09-07

## 2023-09-07 PROBLEM — D22.5 MELANOCYTIC NEVI OF TRUNK: Status: ACTIVE | Noted: 2023-09-07

## 2023-09-07 PROCEDURE — OTHER MIPS QUALITY: OTHER

## 2023-09-07 PROCEDURE — OTHER REASSURANCE: OTHER

## 2023-09-07 PROCEDURE — 99213 OFFICE O/P EST LOW 20 MIN: CPT

## 2023-09-07 PROCEDURE — OTHER PRESCRIPTION: OTHER

## 2023-09-07 PROCEDURE — OTHER COUNSELING: OTHER

## 2023-09-07 RX ORDER — CLOBETASOL PROPIONATE 0.5 MG/ML
SOLUTION TOPICAL
Qty: 50 | Refills: 3 | Status: ERX | COMMUNITY
Start: 2023-09-07

## 2023-09-07 ASSESSMENT — LOCATION ZONE DERM
LOCATION ZONE: TRUNK
LOCATION ZONE: NECK

## 2023-09-07 ASSESSMENT — LOCATION DETAILED DESCRIPTION DERM
LOCATION DETAILED: RIGHT MEDIAL UPPER BACK
LOCATION DETAILED: LEFT SUPERIOR POSTERIOR NECK
LOCATION DETAILED: PERIUMBILICAL SKIN

## 2023-09-07 ASSESSMENT — LOCATION SIMPLE DESCRIPTION DERM
LOCATION SIMPLE: RIGHT UPPER BACK
LOCATION SIMPLE: ABDOMEN
LOCATION SIMPLE: POSTERIOR NECK

## 2023-09-07 NOTE — PROCEDURE: REASSURANCE
Detail Level: Detailed
Hide Additional Notes?: No
Additional Notes (Optional): Discussed process of DF and reassured pt these lesions are benign

## 2023-09-07 NOTE — HPI: SKIN LESIONS
How Severe Is Your Skin Lesion?: mild
Have Your Skin Lesions Been Treated?: not been treated
Is This A New Presentation, Or A Follow-Up?: Skin Lesions
Additional History: Pt is present today for development of skin lesions distributed throughout his body. He states they are itchy and concerning since he mentions he has never had a full body. He denies any personal or family history of skin cancer.\\n\\nHe is also present for dry patches on his scalp. He tried hydrocortisone to treat the patches however he states the medication is not working.

## 2023-12-02 ENCOUNTER — HEALTH MAINTENANCE LETTER (OUTPATIENT)
Age: 38
End: 2023-12-02

## 2025-01-05 ENCOUNTER — HEALTH MAINTENANCE LETTER (OUTPATIENT)
Age: 40
End: 2025-01-05